# Patient Record
Sex: FEMALE | Race: WHITE | NOT HISPANIC OR LATINO
[De-identification: names, ages, dates, MRNs, and addresses within clinical notes are randomized per-mention and may not be internally consistent; named-entity substitution may affect disease eponyms.]

---

## 2020-09-18 ENCOUNTER — APPOINTMENT (OUTPATIENT)
Dept: PLASTIC SURGERY | Facility: CLINIC | Age: 40
End: 2020-09-18
Payer: SELF-PAY

## 2020-09-18 VITALS
HEART RATE: 72 BPM | TEMPERATURE: 97.6 F | SYSTOLIC BLOOD PRESSURE: 112 MMHG | DIASTOLIC BLOOD PRESSURE: 70 MMHG | RESPIRATION RATE: 16 BRPM

## 2020-09-18 VITALS
BODY MASS INDEX: 23.92 KG/M2 | WEIGHT: 130 LBS | SYSTOLIC BLOOD PRESSURE: 114 MMHG | RESPIRATION RATE: 18 BRPM | DIASTOLIC BLOOD PRESSURE: 74 MMHG | OXYGEN SATURATION: 100 % | HEIGHT: 62 IN | TEMPERATURE: 99.1 F | HEART RATE: 82 BPM

## 2020-09-18 DIAGNOSIS — Z78.9 OTHER SPECIFIED HEALTH STATUS: ICD-10-CM

## 2020-09-18 PROBLEM — Z00.00 ENCOUNTER FOR PREVENTIVE HEALTH EXAMINATION: Status: ACTIVE | Noted: 2020-09-18

## 2020-09-18 PROCEDURE — 99202 OFFICE O/P NEW SF 15 MIN: CPT | Mod: NC

## 2020-09-18 NOTE — REASON FOR VISIT
[Consultation] : a consultation visit [FreeTextEntry1] : 40 year old women with abdominal lipodystrophy and rectus diastasis interested in abdominal contouring

## 2020-09-18 NOTE — PHYSICAL EXAM
[NI] : Normal [de-identified] : Rectus diastasis with protuberant central abdomen\par skin excess lower abdomen over low transverse scar \par no masses\par no hernias

## 2020-09-18 NOTE — HISTORY OF PRESENT ILLNESS
[FreeTextEntry1] : Patient status post C section times two\par Generally good health\par no medications\par no smoking

## 2020-09-18 NOTE — ASSESSMENT
[FreeTextEntry1] : A:\par Abdominal lipodystrophy with rectus diastasis \par P:\par DIscussed at length with patient who is a good candidate for Abdominoplasty.  Reviewed the material risks,  benefits,  and alternatives with Ms. TRUMAN GARDNER  , including no surgery, and she  understands and wishes to proceed.\par

## 2021-01-12 ENCOUNTER — APPOINTMENT (OUTPATIENT)
Dept: PLASTIC SURGERY | Facility: HOSPITAL | Age: 41
End: 2021-01-12
Payer: SELF-PAY

## 2021-01-12 PROCEDURE — 15847 EXC SKIN ABD ADD-ON: CPT

## 2021-01-12 PROCEDURE — 15830 EXC EXCESSIVE SKIN ABDOMEN: CPT | Mod: NC,59

## 2021-01-15 ENCOUNTER — APPOINTMENT (OUTPATIENT)
Dept: PLASTIC SURGERY | Facility: CLINIC | Age: 41
End: 2021-01-15
Payer: SELF-PAY

## 2021-01-15 VITALS
HEART RATE: 91 BPM | SYSTOLIC BLOOD PRESSURE: 120 MMHG | TEMPERATURE: 98.6 F | OXYGEN SATURATION: 99 % | RESPIRATION RATE: 20 BRPM | DIASTOLIC BLOOD PRESSURE: 78 MMHG

## 2021-01-15 PROCEDURE — 99024 POSTOP FOLLOW-UP VISIT: CPT | Mod: NC

## 2021-01-15 NOTE — PHYSICAL EXAM
[NI] : Normal [de-identified] : Shape and contour are good\par inciisona are clean and intact\par drain intact with small clot on right-\par cleared

## 2021-01-15 NOTE — REASON FOR VISIT
[Post Op: _________] : a [unfilled] post op visit [FreeTextEntry1] : Patient notes fluid around the right drain site and was asked to come in- Feels well with no fevers or chills at home

## 2021-01-20 ENCOUNTER — APPOINTMENT (OUTPATIENT)
Dept: PLASTIC SURGERY | Facility: CLINIC | Age: 41
End: 2021-01-20
Payer: SELF-PAY

## 2021-01-20 PROCEDURE — 99024 POSTOP FOLLOW-UP VISIT: CPT

## 2021-01-20 NOTE — REASON FOR VISIT
[Post Op: _________] : a [unfilled] post op visit [FreeTextEntry1] : Ms. TRUMAN GARDNER  returns for suture removal, generally doing well with no complaints and no fevers or chills at home.  Generally pleased with the results of her surgery

## 2021-01-20 NOTE — PHYSICAL EXAM
[NI] : Normal [de-identified] : SHape and conotur are good\par incisions are clean and intact\par flaps viable no collection

## 2021-02-03 ENCOUNTER — APPOINTMENT (OUTPATIENT)
Dept: PLASTIC SURGERY | Facility: CLINIC | Age: 41
End: 2021-02-03
Payer: SELF-PAY

## 2021-02-03 VITALS
HEART RATE: 74 BPM | OXYGEN SATURATION: 100 % | SYSTOLIC BLOOD PRESSURE: 124 MMHG | TEMPERATURE: 98.1 F | RESPIRATION RATE: 18 BRPM | DIASTOLIC BLOOD PRESSURE: 78 MMHG

## 2021-02-03 PROCEDURE — 99024 POSTOP FOLLOW-UP VISIT: CPT | Mod: NC

## 2021-02-03 NOTE — ASSESSMENT
[FreeTextEntry1] : A:\par Doing well post operative\par P:\par Prineo removed\par scar care reviewed

## 2021-02-03 NOTE — REASON FOR VISIT
[Post Op: _________] : a [unfilled] post op visit [FreeTextEntry1] : Ms. TRUMAN GARDNER returns for a follow up visit, generally doing well with no complaints and no fevers or chills at home.  generally doing well

## 2021-03-10 ENCOUNTER — APPOINTMENT (OUTPATIENT)
Dept: PLASTIC SURGERY | Facility: CLINIC | Age: 41
End: 2021-03-10
Payer: SELF-PAY

## 2021-03-10 VITALS
SYSTOLIC BLOOD PRESSURE: 134 MMHG | DIASTOLIC BLOOD PRESSURE: 81 MMHG | HEART RATE: 77 BPM | RESPIRATION RATE: 20 BRPM | OXYGEN SATURATION: 98 % | TEMPERATURE: 98.1 F

## 2021-03-10 PROCEDURE — 99024 POSTOP FOLLOW-UP VISIT: CPT | Mod: NC

## 2021-03-10 NOTE — REASON FOR VISIT
[Follow-Up: _____] : a [unfilled] follow-up visit [FreeTextEntry1] : two months after surgery with residual swelling in the pedro pablo umbilical area.  Generally pleased with results

## 2021-03-10 NOTE — PHYSICAL EXAM
[NI] : Normal [de-identified] : Shape and contour are good\par scars healing well\par residual edema present

## 2021-05-05 ENCOUNTER — APPOINTMENT (OUTPATIENT)
Dept: PLASTIC SURGERY | Facility: CLINIC | Age: 41
End: 2021-05-05
Payer: SELF-PAY

## 2021-05-05 VITALS
SYSTOLIC BLOOD PRESSURE: 114 MMHG | DIASTOLIC BLOOD PRESSURE: 75 MMHG | OXYGEN SATURATION: 99 % | HEART RATE: 72 BPM | TEMPERATURE: 99 F | RESPIRATION RATE: 18 BRPM

## 2021-05-05 PROCEDURE — 99212 OFFICE O/P EST SF 10 MIN: CPT | Mod: NC

## 2021-05-05 NOTE — REASON FOR VISIT
[Follow-Up: _____] : a [unfilled] follow-up visit [FreeTextEntry1] : Ms. TRUMAN GARDNER returns for a follow up visit, generally doing well with no complaints and no fevers or chills at home.  generally pleased with results

## 2021-05-05 NOTE — PHYSICAL EXAM
[NI] : Normal [de-identified] : slightly raised lateral scar on right\par shape an contour are good\par flaps viable no collection

## 2021-11-10 ENCOUNTER — APPOINTMENT (OUTPATIENT)
Dept: PLASTIC SURGERY | Facility: CLINIC | Age: 41
End: 2021-11-10
Payer: SELF-PAY

## 2021-11-10 VITALS
HEART RATE: 81 BPM | RESPIRATION RATE: 20 BRPM | DIASTOLIC BLOOD PRESSURE: 77 MMHG | BODY MASS INDEX: 6.07 KG/M2 | TEMPERATURE: 98.8 F | SYSTOLIC BLOOD PRESSURE: 114 MMHG | HEIGHT: 62 IN | WEIGHT: 33 LBS | OXYGEN SATURATION: 98 %

## 2021-11-10 PROCEDURE — 99212 OFFICE O/P EST SF 10 MIN: CPT | Mod: NC

## 2021-11-10 NOTE — ASSESSMENT
[FreeTextEntry1] : A:\par Prominent lateral scars following Abdominoplasty\par P:\par Mepiform to abdomen\par Discussed possible scar revision.  Reviewed the material risks,  benefits,  and alternatives with Ms. TRUMAN GARDNER  , including no surgery, and she  understands and wishes to proceed.\par \par

## 2021-11-10 NOTE — REASON FOR VISIT
[Follow-Up: _____] : a [unfilled] follow-up visit [FreeTextEntry1] : Ms. TRUMAN GARDNER returns for a follow up visit, generally doing well with no complaints and no fevers or chills at home, bothered by lateral scars

## 2021-11-10 NOTE — PHYSICAL EXAM
[NI] : Normal [de-identified] : Good shape and contour\par raised scar lateral abdominal incision, left more than the right

## 2022-03-16 RX ORDER — OXYCODONE 5 MG/1
5 TABLET ORAL
Qty: 10 | Refills: 0 | Status: ACTIVE | COMMUNITY
Start: 2022-03-16 | End: 1900-01-01

## 2022-03-16 RX ORDER — CEPHALEXIN 500 MG/1
500 TABLET ORAL
Qty: 9 | Refills: 0 | Status: ACTIVE | COMMUNITY
Start: 2022-03-16 | End: 1900-01-01

## 2022-03-16 RX ORDER — ALPRAZOLAM 0.25 MG/1
0.25 TABLET ORAL
Qty: 2 | Refills: 0 | Status: ACTIVE | COMMUNITY
Start: 2022-03-16 | End: 1900-01-01

## 2022-03-23 ENCOUNTER — APPOINTMENT (OUTPATIENT)
Dept: PLASTIC SURGERY | Facility: CLINIC | Age: 42
End: 2022-03-23
Payer: SELF-PAY

## 2022-03-23 VITALS
SYSTOLIC BLOOD PRESSURE: 118 MMHG | HEART RATE: 77 BPM | RESPIRATION RATE: 20 BRPM | OXYGEN SATURATION: 98 % | DIASTOLIC BLOOD PRESSURE: 75 MMHG

## 2022-03-23 VITALS
RESPIRATION RATE: 20 BRPM | HEART RATE: 92 BPM | OXYGEN SATURATION: 98 % | DIASTOLIC BLOOD PRESSURE: 80 MMHG | TEMPERATURE: 97.8 F | SYSTOLIC BLOOD PRESSURE: 123 MMHG

## 2022-03-23 DIAGNOSIS — Z41.1 ENCOUNTER FOR COSMETIC SURGERY: ICD-10-CM

## 2022-03-23 PROCEDURE — 14000 TIS TRNFR TRUNK 10 SQ CM/<: CPT | Mod: NC

## 2022-03-23 NOTE — PROCEDURE
[FreeTextEntry1] : Raised scars lateral abdomen following abdominoplasty  [FreeTextEntry2] : Scar revision bilateral abdomen  [FreeTextEntry3] : Xylocaine 1% with epinephrine  [FreeTextEntry4] : 25 cc [FreeTextEntry5] : none  [FreeTextEntry6] : THe bilateral lateral abdominal scars were marked for excision with the patient in agreement.  Following a sterile prep and drape, Xylocaine anesthesia was injected and incision on the right made as marked.  Tissue ellipse was excised and hemostasis was assured with cautery. The incision was closed with Monocryl dermal and subcuticular tissue, and scar excised on the left side.  Hemostasis was assured and the incision closed with Monocryl dermal and subcuticular sutures.  Patient tolerated well.    [FreeTextEntry7] : none

## 2022-03-23 NOTE — ASSESSMENT
[FreeTextEntry1] : A:\par Prominent lateral abdominal scars\par P:\par Scar revision bilateral lateral abdominal scars\par Tolerated well\par Instructions reviewed

## 2022-03-23 NOTE — REASON FOR VISIT
[Procedure: _________] : a [unfilled] procedure visit [FreeTextEntry1] : Patient returns for scar revision bilateral lateral abdominal incision

## 2022-03-31 ENCOUNTER — APPOINTMENT (OUTPATIENT)
Dept: PLASTIC SURGERY | Facility: CLINIC | Age: 42
End: 2022-03-31
Payer: SELF-PAY

## 2022-03-31 VITALS
OXYGEN SATURATION: 97 % | BODY MASS INDEX: 24.69 KG/M2 | SYSTOLIC BLOOD PRESSURE: 124 MMHG | HEART RATE: 87 BPM | DIASTOLIC BLOOD PRESSURE: 70 MMHG | WEIGHT: 135 LBS | TEMPERATURE: 97.5 F | RESPIRATION RATE: 20 BRPM

## 2022-03-31 PROCEDURE — 99024 POSTOP FOLLOW-UP VISIT: CPT

## 2022-03-31 NOTE — PHYSICAL EXAM
[NI] : Normal [de-identified] : Shape and contour are good\par incision clean and intact\par flaps viable no collection

## 2022-03-31 NOTE — REASON FOR VISIT
[Post Op: _________] : a [unfilled] post op visit [FreeTextEntry1] : Ms. TRUMAN GARDNER returns for a follow up visit, generally doing well with no complaints and no fevers or chills at home, generally doing well no complaints

## 2022-04-18 ENCOUNTER — APPOINTMENT (OUTPATIENT)
Dept: PLASTIC SURGERY | Facility: CLINIC | Age: 42
End: 2022-04-18
Payer: SELF-PAY

## 2022-04-18 PROCEDURE — 99024 POSTOP FOLLOW-UP VISIT: CPT

## 2022-04-18 NOTE — REASON FOR VISIT
[Post Op: _________] : a [unfilled] post op visit [FreeTextEntry1] : Ms. TRUMAN GARDNER returns for a follow up visit, generally doing well with no complaints and no fevers or chills at home, palpable lateral suture

## 2022-04-18 NOTE — PHYSICAL EXAM
[NI] : Normal [de-identified] : Shape and contour are good\par palpable absorbable knot at incision margins

## 2022-06-09 ENCOUNTER — APPOINTMENT (OUTPATIENT)
Dept: PLASTIC SURGERY | Facility: CLINIC | Age: 42
End: 2022-06-09
Payer: SELF-PAY

## 2022-06-09 VITALS
DIASTOLIC BLOOD PRESSURE: 86 MMHG | OXYGEN SATURATION: 98 % | HEART RATE: 76 BPM | SYSTOLIC BLOOD PRESSURE: 137 MMHG | RESPIRATION RATE: 20 BRPM | TEMPERATURE: 98.3 F

## 2022-06-09 DIAGNOSIS — L90.5 SCAR CONDITIONS AND FIBROSIS OF SKIN: ICD-10-CM

## 2022-06-09 PROCEDURE — 99024 POSTOP FOLLOW-UP VISIT: CPT

## 2022-06-09 NOTE — REASON FOR VISIT
[Follow-Up: _____] : a [unfilled] follow-up visit [FreeTextEntry1] : Ms. TRUMAN GARDNER returns for a follow up visit, generally doing well with no complaints and no fevers or chills at home, generally doing well